# Patient Record
Sex: FEMALE | Race: BLACK OR AFRICAN AMERICAN | Employment: FULL TIME | URBAN - METROPOLITAN AREA
[De-identification: names, ages, dates, MRNs, and addresses within clinical notes are randomized per-mention and may not be internally consistent; named-entity substitution may affect disease eponyms.]

---

## 2017-03-20 LAB
ANTIBODY SCREEN OB: NEGATIVE
HEPATITIS B SURFACE ANTIGEN OB: NEGATIVE
HIV RESULT OB: NEGATIVE
RAPID PLASMA REAGIN OB: NONREACTIVE

## 2017-06-25 ENCOUNTER — HOSPITAL ENCOUNTER (INPATIENT)
Facility: HOSPITAL | Age: 42
LOS: 3 days | Discharge: HOME | End: 2017-06-28
Attending: OBSTETRICS & GYNECOLOGY | Admitting: OBSTETRICS & GYNECOLOGY
Payer: COMMERCIAL

## 2017-06-25 ENCOUNTER — APPOINTMENT (OUTPATIENT)
Dept: ULTRASOUND IMAGING | Facility: HOSPITAL | Age: 42
End: 2017-06-25
Attending: OBSTETRICS & GYNECOLOGY
Payer: COMMERCIAL

## 2017-06-25 PROBLEM — Z34.90 PREGNANCY: Status: ACTIVE | Noted: 2017-06-25

## 2017-06-25 LAB
ALBUMIN SERPL-MCNC: 2.5 G/DL (ref 3.5–4.8)
ALBUMIN SERPL-MCNC: 2.7 G/DL (ref 3.5–4.8)
ALBUMIN SERPL-MCNC: 2.9 G/DL (ref 3.5–4.8)
ALBUMIN SERPL-MCNC: 3 G/DL (ref 3.5–4.8)
ALBUMIN SERPL-MCNC: 3.4 G/DL (ref 3.5–4.8)
ALP LIVER SERPL-CCNC: 117 U/L (ref 37–98)
ALP LIVER SERPL-CCNC: 83 U/L (ref 37–98)
ALP LIVER SERPL-CCNC: 91 U/L (ref 37–98)
ALP LIVER SERPL-CCNC: 92 U/L (ref 37–98)
ALP LIVER SERPL-CCNC: 96 U/L (ref 37–98)
ALT SERPL-CCNC: 62 U/L (ref 14–54)
ALT SERPL-CCNC: 67 U/L (ref 14–54)
ALT SERPL-CCNC: 73 U/L (ref 14–54)
ALT SERPL-CCNC: 75 U/L (ref 14–54)
ALT SERPL-CCNC: 86 U/L (ref 14–54)
AMPHETAMINE URINE: NEGATIVE
ANTIBODY SCREEN: NEGATIVE
APTT PPP: 30 SECONDS (ref 25–34)
APTT PPP: 30.4 SECONDS (ref 25–34)
APTT PPP: 31.8 SECONDS (ref 25–34)
APTT PPP: 34.2 SECONDS (ref 25–34)
APTT PPP: 40.9 SECONDS (ref 25–34)
AST SERPL-CCNC: 45 U/L (ref 15–41)
AST SERPL-CCNC: 45 U/L (ref 15–41)
AST SERPL-CCNC: 49 U/L (ref 15–41)
AST SERPL-CCNC: 51 U/L (ref 15–41)
AST SERPL-CCNC: 59 U/L (ref 15–41)
BARBITURATES URINE: NEGATIVE
BASOPHILS # BLD AUTO: 0.01 X10(3) UL (ref 0–0.1)
BASOPHILS # BLD AUTO: 0.02 X10(3) UL (ref 0–0.1)
BASOPHILS NFR BLD AUTO: 0.1 %
BENZODIAZEPINES URINE: NEGATIVE
BILIRUB SERPL-MCNC: 0.5 MG/DL (ref 0.1–2)
BILIRUB SERPL-MCNC: 0.6 MG/DL (ref 0.1–2)
BILIRUB SERPL-MCNC: 0.6 MG/DL (ref 0.1–2)
BILIRUB SERPL-MCNC: 0.8 MG/DL (ref 0.1–2)
BILIRUB SERPL-MCNC: 1 MG/DL (ref 0.1–2)
BUN BLD-MCNC: 6 MG/DL (ref 8–20)
BUN BLD-MCNC: 7 MG/DL (ref 8–20)
BUN BLD-MCNC: 7 MG/DL (ref 8–20)
CALCIUM BLD-MCNC: 8 MG/DL (ref 8.3–10.3)
CALCIUM BLD-MCNC: 8.1 MG/DL (ref 8.3–10.3)
CALCIUM BLD-MCNC: 8.8 MG/DL (ref 8.3–10.3)
CALCIUM BLD-MCNC: 8.8 MG/DL (ref 8.3–10.3)
CALCIUM BLD-MCNC: 8.9 MG/DL (ref 8.3–10.3)
CANNABINOID URINE: NEGATIVE
CHLORIDE: 100 MMOL/L (ref 101–111)
CHLORIDE: 102 MMOL/L (ref 101–111)
CHLORIDE: 103 MMOL/L (ref 101–111)
CHLORIDE: 105 MMOL/L (ref 101–111)
CHLORIDE: 106 MMOL/L (ref 101–111)
CO2: 21 MMOL/L (ref 22–32)
CO2: 23 MMOL/L (ref 22–32)
CO2: 23 MMOL/L (ref 22–32)
CO2: 24 MMOL/L (ref 22–32)
CO2: 24 MMOL/L (ref 22–32)
COCAINE URINE: NEGATIVE
CONTROL RUN WITHIN 24 HOURS?: YES
CREAT BLD-MCNC: 0.61 MG/DL (ref 0.55–1.02)
CREAT BLD-MCNC: 0.7 MG/DL (ref 0.55–1.02)
CREAT BLD-MCNC: 0.71 MG/DL (ref 0.55–1.02)
CREAT BLD-MCNC: 0.8 MG/DL (ref 0.55–1.02)
CREAT BLD-MCNC: 0.82 MG/DL (ref 0.55–1.02)
CREAT UR-SCNC: 327 MG/DL
EOSINOPHIL # BLD AUTO: 0 X10(3) UL (ref 0–0.3)
EOSINOPHIL # BLD AUTO: 0.01 X10(3) UL (ref 0–0.3)
EOSINOPHIL NFR BLD AUTO: 0 %
EOSINOPHIL NFR BLD AUTO: 0.1 %
ERYTHROCYTE [DISTWIDTH] IN BLOOD BY AUTOMATED COUNT: 12.7 % (ref 11.5–16)
ERYTHROCYTE [DISTWIDTH] IN BLOOD BY AUTOMATED COUNT: 13 % (ref 11.5–16)
ERYTHROCYTE [DISTWIDTH] IN BLOOD BY AUTOMATED COUNT: 13.1 % (ref 11.5–16)
ERYTHROCYTE [DISTWIDTH] IN BLOOD BY AUTOMATED COUNT: 13.2 % (ref 11.5–16)
ERYTHROCYTE [DISTWIDTH] IN BLOOD BY AUTOMATED COUNT: 13.3 % (ref 11.5–16)
EST. AVERAGE GLUCOSE BLD GHB EST-MCNC: 100 MG/DL (ref 68–126)
FIBRINOGEN: 135 MG/DL (ref 200–446)
FIBRINOGEN: 186 MG/DL (ref 200–446)
FIBRINOGEN: 216 MG/DL (ref 200–446)
FIBRINOGEN: 224 MG/DL (ref 200–446)
FIBRINOGEN: 68 MG/DL (ref 200–446)
GLUCOSE BLD-MCNC: 100 MG/DL (ref 70–99)
GLUCOSE BLD-MCNC: 101 MG/DL (ref 70–99)
GLUCOSE BLD-MCNC: 108 MG/DL (ref 70–99)
GLUCOSE BLD-MCNC: 113 MG/DL (ref 70–99)
GLUCOSE BLD-MCNC: 116 MG/DL (ref 70–99)
GLUCOSE URINE: NEGATIVE
HAV IGM SER QL: 4.1 MG/DL (ref 1.7–3)
HBA1C MFR BLD HPLC: 5.1 % (ref ?–5.7)
HCT VFR BLD AUTO: 21.4 % (ref 34–50)
HCT VFR BLD AUTO: 26.2 % (ref 34–50)
HCT VFR BLD AUTO: 27.1 % (ref 34–50)
HCT VFR BLD AUTO: 27.4 % (ref 34–50)
HCT VFR BLD AUTO: 29 % (ref 34–50)
HGB BLD-MCNC: 10.1 G/DL (ref 12–16)
HGB BLD-MCNC: 7.3 G/DL (ref 12–16)
HGB BLD-MCNC: 9 G/DL (ref 12–16)
HGB BLD-MCNC: 9.3 G/DL (ref 12–16)
HGB BLD-MCNC: 9.4 G/DL (ref 12–16)
HOMOCYSTEINE: 5.4 UMOL/L (ref 5–13.9)
IMMATURE GRANULOCYTE COUNT: 0.05 X10(3) UL (ref 0–1)
IMMATURE GRANULOCYTE COUNT: 0.06 X10(3) UL (ref 0–1)
IMMATURE GRANULOCYTE COUNT: 0.09 X10(3) UL (ref 0–1)
IMMATURE GRANULOCYTE COUNT: 0.12 X10(3) UL (ref 0–1)
IMMATURE GRANULOCYTE RATIO %: 0.4 %
IMMATURE GRANULOCYTE RATIO %: 0.5 %
IMMATURE GRANULOCYTE RATIO %: 0.7 %
IMMATURE GRANULOCYTE RATIO %: 0.9 %
INR BLD: 1.16 (ref 0.89–1.11)
INR BLD: 1.19 (ref 0.89–1.11)
INR BLD: 1.2 (ref 0.89–1.11)
INR BLD: 1.23 (ref 0.89–1.11)
INR BLD: 1.45 (ref 0.89–1.11)
KETONE URINE: NEGATIVE
KLEIHAUER BETKE RESULT: NEGATIVE
LEUKOCYTE ESTERASE URINE: NEGATIVE
LYMPHOCYTES # BLD AUTO: 0.51 X10(3) UL (ref 0.9–4)
LYMPHOCYTES # BLD AUTO: 0.54 X10(3) UL (ref 0.9–4)
LYMPHOCYTES # BLD AUTO: 0.72 X10(3) UL (ref 0.9–4)
LYMPHOCYTES # BLD AUTO: 0.9 X10(3) UL (ref 0.9–4)
LYMPHOCYTES NFR BLD AUTO: 4.3 %
LYMPHOCYTES NFR BLD AUTO: 4.6 %
LYMPHOCYTES NFR BLD AUTO: 5.2 %
LYMPHOCYTES NFR BLD AUTO: 6.6 %
M PROTEIN MFR SERPL ELPH: 5.8 G/DL (ref 6.1–8.3)
M PROTEIN MFR SERPL ELPH: 6.2 G/DL (ref 6.1–8.3)
M PROTEIN MFR SERPL ELPH: 6.6 G/DL (ref 6.1–8.3)
M PROTEIN MFR SERPL ELPH: 6.8 G/DL (ref 6.1–8.3)
M PROTEIN MFR SERPL ELPH: 7.9 G/DL (ref 6.1–8.3)
MCH RBC QN AUTO: 30.8 PG (ref 27–33.2)
MCH RBC QN AUTO: 30.8 PG (ref 27–33.2)
MCH RBC QN AUTO: 31.2 PG (ref 27–33.2)
MCH RBC QN AUTO: 31.3 PG (ref 27–33.2)
MCH RBC QN AUTO: 31.6 PG (ref 27–33.2)
MCHC RBC AUTO-ENTMCNC: 34.1 G/DL (ref 31–37)
MCHC RBC AUTO-ENTMCNC: 34.3 G/DL (ref 31–37)
MCHC RBC AUTO-ENTMCNC: 34.3 G/DL (ref 31–37)
MCHC RBC AUTO-ENTMCNC: 34.4 G/DL (ref 31–37)
MCHC RBC AUTO-ENTMCNC: 34.8 G/DL (ref 31–37)
MCV RBC AUTO: 89.5 FL (ref 81–100)
MCV RBC AUTO: 89.7 FL (ref 81–100)
MCV RBC AUTO: 89.8 FL (ref 81–100)
MCV RBC AUTO: 91.2 FL (ref 81–100)
MCV RBC AUTO: 92.6 FL (ref 81–100)
MONOCYTES # BLD AUTO: 0.46 X10(3) UL (ref 0.1–0.6)
MONOCYTES # BLD AUTO: 0.48 X10(3) UL (ref 0.1–0.6)
MONOCYTES # BLD AUTO: 0.84 X10(3) UL (ref 0.1–0.6)
MONOCYTES # BLD AUTO: 0.96 X10(3) UL (ref 0.1–0.6)
MONOCYTES NFR BLD AUTO: 3.9 %
MONOCYTES NFR BLD AUTO: 4.1 %
MONOCYTES NFR BLD AUTO: 6 %
MONOCYTES NFR BLD AUTO: 7.1 %
MORPHOLOGY: NORMAL
NEUTROPHIL ABS PRELIM: 10.61 X10 (3) UL (ref 1.3–6.7)
NEUTROPHIL ABS PRELIM: 10.76 X10 (3) UL (ref 1.3–6.7)
NEUTROPHIL ABS PRELIM: 11.64 X10 (3) UL (ref 1.3–6.7)
NEUTROPHIL ABS PRELIM: 12.19 X10 (3) UL (ref 1.3–6.7)
NEUTROPHILS # BLD AUTO: 10.61 X10(3) UL (ref 1.3–6.7)
NEUTROPHILS # BLD AUTO: 10.76 X10(3) UL (ref 1.3–6.7)
NEUTROPHILS # BLD AUTO: 11.64 X10(3) UL (ref 1.3–6.7)
NEUTROPHILS # BLD AUTO: 12.19 X10(3) UL (ref 1.3–6.7)
NEUTROPHILS NFR BLD AUTO: 85.4 %
NEUTROPHILS NFR BLD AUTO: 87.8 %
NEUTROPHILS NFR BLD AUTO: 91 %
NEUTROPHILS NFR BLD AUTO: 91 %
NITRITE URINE: NEGATIVE
OPIATE URINE: NEGATIVE
PCP URINE: NEGATIVE
PH URINE: 6.5 (ref 5–8)
PLATELET # BLD AUTO: 101 10(3)UL (ref 150–450)
PLATELET # BLD AUTO: 102 10(3)UL (ref 150–450)
PLATELET # BLD AUTO: 113 10(3)UL (ref 150–450)
PLATELET # BLD AUTO: 117 10(3)UL (ref 150–450)
PLATELET # BLD AUTO: 99 10(3)UL (ref 150–450)
PLATELET MORPHOLOGY: NORMAL
POTASSIUM SERPL-SCNC: 3.2 MMOL/L (ref 3.6–5.1)
POTASSIUM SERPL-SCNC: 3.5 MMOL/L (ref 3.6–5.1)
POTASSIUM SERPL-SCNC: 3.7 MMOL/L (ref 3.6–5.1)
POTASSIUM SERPL-SCNC: 3.8 MMOL/L (ref 3.6–5.1)
POTASSIUM SERPL-SCNC: 4 MMOL/L (ref 3.6–5.1)
PROT UR-MCNC: 234.5 MG/DL
PROTEIN URINE: 300
PSA SERPL DL<=0.01 NG/ML-MCNC: 14.9 SECONDS (ref 12–14.3)
PSA SERPL DL<=0.01 NG/ML-MCNC: 15.2 SECONDS (ref 12–14.3)
PSA SERPL DL<=0.01 NG/ML-MCNC: 15.3 SECONDS (ref 12–14.3)
PSA SERPL DL<=0.01 NG/ML-MCNC: 15.6 SECONDS (ref 12–14.3)
PSA SERPL DL<=0.01 NG/ML-MCNC: 17.8 SECONDS (ref 12–14.3)
RAPID HIV: NONREACTIVE
RBC # BLD AUTO: 2.31 X10(6)UL (ref 3.8–5.1)
RBC # BLD AUTO: 2.92 X10(6)UL (ref 3.8–5.1)
RBC # BLD AUTO: 2.97 X10(6)UL (ref 3.8–5.1)
RBC # BLD AUTO: 3.05 X10(6)UL (ref 3.8–5.1)
RBC # BLD AUTO: 3.24 X10(6)UL (ref 3.8–5.1)
RED CELL DISTRIBUTION WIDTH-SD: 41.4 FL (ref 35.1–46.3)
RED CELL DISTRIBUTION WIDTH-SD: 42.3 FL (ref 35.1–46.3)
RED CELL DISTRIBUTION WIDTH-SD: 42.7 FL (ref 35.1–46.3)
RED CELL DISTRIBUTION WIDTH-SD: 43.5 FL (ref 35.1–46.3)
RED CELL DISTRIBUTION WIDTH-SD: 43.6 FL (ref 35.1–46.3)
RH BLOOD TYPE: POSITIVE
SODIUM SERPL-SCNC: 135 MMOL/L (ref 136–144)
SODIUM SERPL-SCNC: 135 MMOL/L (ref 136–144)
SODIUM SERPL-SCNC: 136 MMOL/L (ref 136–144)
SODIUM SERPL-SCNC: 137 MMOL/L (ref 136–144)
SODIUM SERPL-SCNC: 140 MMOL/L (ref 136–144)
SPEC GRAVITY: >=1.03 (ref 1–1.03)
T PALLIDUM AB SER QL IA: NONREACTIVE
TOXIC GRANULATION: PRESENT
TSI SER-ACNC: 1.78 MIU/ML (ref 0.35–5.5)
URIC ACID: 3.5 MG/DL (ref 2.4–8)
URINE PROTEIN/CREATININE RATIO, RANDOM: 0.72
UROBILINOGEN URINE: 1
WBC # BLD AUTO: 11.7 X10(3) UL (ref 4–13)
WBC # BLD AUTO: 11.8 X10(3) UL (ref 4–13)
WBC # BLD AUTO: 13.6 X10(3) UL (ref 4–13)
WBC # BLD AUTO: 13.9 X10(3) UL (ref 4–13)
WBC # BLD AUTO: 9.7 X10(3) UL (ref 4–13)

## 2017-06-25 PROCEDURE — 81291 MTHFR GENE: CPT | Performed by: OBSTETRICS & GYNECOLOGY

## 2017-06-25 PROCEDURE — 85027 COMPLETE CBC AUTOMATED: CPT | Performed by: OBSTETRICS & GYNECOLOGY

## 2017-06-25 PROCEDURE — 85610 PROTHROMBIN TIME: CPT | Performed by: OBSTETRICS & GYNECOLOGY

## 2017-06-25 PROCEDURE — 85732 THROMBOPLASTIN TIME PARTIAL: CPT | Performed by: OBSTETRICS & GYNECOLOGY

## 2017-06-25 PROCEDURE — 86645 CMV ANTIBODY IGM: CPT | Performed by: OBSTETRICS & GYNECOLOGY

## 2017-06-25 PROCEDURE — 86850 RBC ANTIBODY SCREEN: CPT | Performed by: OBSTETRICS & GYNECOLOGY

## 2017-06-25 PROCEDURE — 85613 RUSSELL VIPER VENOM DILUTED: CPT | Performed by: OBSTETRICS & GYNECOLOGY

## 2017-06-25 PROCEDURE — 85306 CLOT INHIBIT PROT S FREE: CPT | Performed by: OBSTETRICS & GYNECOLOGY

## 2017-06-25 PROCEDURE — 84156 ASSAY OF PROTEIN URINE: CPT | Performed by: OBSTETRICS & GYNECOLOGY

## 2017-06-25 PROCEDURE — 76815 OB US LIMITED FETUS(S): CPT | Performed by: OBSTETRICS & GYNECOLOGY

## 2017-06-25 PROCEDURE — 80053 COMPREHEN METABOLIC PANEL: CPT | Performed by: OBSTETRICS & GYNECOLOGY

## 2017-06-25 PROCEDURE — 30233N1 TRANSFUSION OF NONAUTOLOGOUS RED BLOOD CELLS INTO PERIPHERAL VEIN, PERCUTANEOUS APPROACH: ICD-10-PCS | Performed by: OBSTETRICS & GYNECOLOGY

## 2017-06-25 PROCEDURE — 81241 F5 GENE: CPT | Performed by: OBSTETRICS & GYNECOLOGY

## 2017-06-25 PROCEDURE — 85384 FIBRINOGEN ACTIVITY: CPT | Performed by: OBSTETRICS & GYNECOLOGY

## 2017-06-25 PROCEDURE — 86694 HERPES SIMPLEX NES ANTBDY: CPT | Performed by: OBSTETRICS & GYNECOLOGY

## 2017-06-25 PROCEDURE — 86701 HIV-1ANTIBODY: CPT | Performed by: OBSTETRICS & GYNECOLOGY

## 2017-06-25 PROCEDURE — 99255 IP/OBS CONSLTJ NEW/EST HI 80: CPT | Performed by: INTERNAL MEDICINE

## 2017-06-25 PROCEDURE — 86778 TOXOPLASMA ANTIBODY IGM: CPT | Performed by: OBSTETRICS & GYNECOLOGY

## 2017-06-25 PROCEDURE — 59409 OBSTETRICAL CARE: CPT | Performed by: OBSTETRICS & GYNECOLOGY

## 2017-06-25 PROCEDURE — 81240 F2 GENE: CPT | Performed by: OBSTETRICS & GYNECOLOGY

## 2017-06-25 PROCEDURE — 3E0P7GC INTRODUCTION OF OTHER THERAPEUTIC SUBSTANCE INTO FEMALE REPRODUCTIVE, VIA NATURAL OR ARTIFICIAL OPENING: ICD-10-PCS | Performed by: OBSTETRICS & GYNECOLOGY

## 2017-06-25 PROCEDURE — 85730 THROMBOPLASTIN TIME PARTIAL: CPT | Performed by: OBSTETRICS & GYNECOLOGY

## 2017-06-25 PROCEDURE — 86147 CARDIOLIPIN ANTIBODY EA IG: CPT | Performed by: OBSTETRICS & GYNECOLOGY

## 2017-06-25 PROCEDURE — 82570 ASSAY OF URINE CREATININE: CPT | Performed by: OBSTETRICS & GYNECOLOGY

## 2017-06-25 PROCEDURE — 30233K1 TRANSFUSION OF NONAUTOLOGOUS FROZEN PLASMA INTO PERIPHERAL VEIN, PERCUTANEOUS APPROACH: ICD-10-PCS | Performed by: OBSTETRICS & GYNECOLOGY

## 2017-06-25 PROCEDURE — 86762 RUBELLA ANTIBODY: CPT | Performed by: OBSTETRICS & GYNECOLOGY

## 2017-06-25 PROCEDURE — 3E033VJ INTRODUCTION OF OTHER HORMONE INTO PERIPHERAL VEIN, PERCUTANEOUS APPROACH: ICD-10-PCS | Performed by: OBSTETRICS & GYNECOLOGY

## 2017-06-25 PROCEDURE — 80307 DRUG TEST PRSMV CHEM ANLYZR: CPT | Performed by: OBSTETRICS & GYNECOLOGY

## 2017-06-25 PROCEDURE — 85303 CLOT INHIBIT PROT C ACTIVITY: CPT | Performed by: OBSTETRICS & GYNECOLOGY

## 2017-06-25 PROCEDURE — 30233M1 TRANSFUSION OF NONAUTOLOGOUS PLASMA CRYOPRECIPITATE INTO PERIPHERAL VEIN, PERCUTANEOUS APPROACH: ICD-10-PCS | Performed by: OBSTETRICS & GYNECOLOGY

## 2017-06-25 PROCEDURE — 86146 BETA-2 GLYCOPROTEIN ANTIBODY: CPT | Performed by: OBSTETRICS & GYNECOLOGY

## 2017-06-25 PROCEDURE — 85301 ANTITHROMBIN III ANTIGEN: CPT | Performed by: OBSTETRICS & GYNECOLOGY

## 2017-06-25 PROCEDURE — 86901 BLOOD TYPING SEROLOGIC RH(D): CPT | Performed by: OBSTETRICS & GYNECOLOGY

## 2017-06-25 PROCEDURE — 85705 THROMBOPLASTIN INHIBITION: CPT | Performed by: OBSTETRICS & GYNECOLOGY

## 2017-06-25 PROCEDURE — 85300 ANTITHROMBIN III ACTIVITY: CPT | Performed by: OBSTETRICS & GYNECOLOGY

## 2017-06-25 PROCEDURE — 85670 THROMBIN TIME PLASMA: CPT | Performed by: OBSTETRICS & GYNECOLOGY

## 2017-06-25 PROCEDURE — 86747 PARVOVIRUS ANTIBODY: CPT | Performed by: OBSTETRICS & GYNECOLOGY

## 2017-06-25 PROCEDURE — 86900 BLOOD TYPING SEROLOGIC ABO: CPT | Performed by: OBSTETRICS & GYNECOLOGY

## 2017-06-25 PROCEDURE — 83090 ASSAY OF HOMOCYSTEINE: CPT | Performed by: OBSTETRICS & GYNECOLOGY

## 2017-06-25 PROCEDURE — 86780 TREPONEMA PALLIDUM: CPT | Performed by: OBSTETRICS & GYNECOLOGY

## 2017-06-25 PROCEDURE — 85025 COMPLETE CBC W/AUTO DIFF WBC: CPT | Performed by: OBSTETRICS & GYNECOLOGY

## 2017-06-25 PROCEDURE — 84550 ASSAY OF BLOOD/URIC ACID: CPT | Performed by: OBSTETRICS & GYNECOLOGY

## 2017-06-25 PROCEDURE — 84443 ASSAY THYROID STIM HORMONE: CPT | Performed by: OBSTETRICS & GYNECOLOGY

## 2017-06-25 PROCEDURE — 83036 HEMOGLOBIN GLYCOSYLATED A1C: CPT | Performed by: OBSTETRICS & GYNECOLOGY

## 2017-06-25 RX ORDER — HYDROMORPHONE HYDROCHLORIDE 1 MG/ML
0.4 INJECTION, SOLUTION INTRAMUSCULAR; INTRAVENOUS; SUBCUTANEOUS EVERY 30 MIN PRN
Status: DISCONTINUED | OUTPATIENT
Start: 2017-06-25 | End: 2017-06-28

## 2017-06-25 RX ORDER — SODIUM CHLORIDE 9 MG/ML
INJECTION, SOLUTION INTRAVENOUS ONCE
Status: DISCONTINUED | OUTPATIENT
Start: 2017-06-25 | End: 2017-06-28

## 2017-06-25 RX ORDER — HYDRALAZINE HYDROCHLORIDE 20 MG/ML
10 INJECTION INTRAMUSCULAR; INTRAVENOUS ONCE AS NEEDED
Status: DISCONTINUED | OUTPATIENT
Start: 2017-06-28 | End: 2017-06-28

## 2017-06-25 RX ORDER — MISOPROSTOL 200 UG/1
200 TABLET ORAL EVERY 4 HOURS
Status: DISCONTINUED | OUTPATIENT
Start: 2017-06-25 | End: 2017-06-25

## 2017-06-25 RX ORDER — HYDROMORPHONE HYDROCHLORIDE 1 MG/ML
1 INJECTION, SOLUTION INTRAMUSCULAR; INTRAVENOUS; SUBCUTANEOUS
Status: DISCONTINUED | OUTPATIENT
Start: 2017-06-25 | End: 2017-06-25

## 2017-06-25 RX ORDER — LABETALOL HYDROCHLORIDE 5 MG/ML
40 INJECTION, SOLUTION INTRAVENOUS ONCE AS NEEDED
Status: ACTIVE | OUTPATIENT
Start: 2017-06-26 | End: 2017-06-26

## 2017-06-25 RX ORDER — HYDROMORPHONE HYDROCHLORIDE 1 MG/ML
INJECTION, SOLUTION INTRAMUSCULAR; INTRAVENOUS; SUBCUTANEOUS
Status: COMPLETED
Start: 2017-06-25 | End: 2017-06-26

## 2017-06-25 RX ORDER — PYRIDOXINE HCL (VITAMIN B6) 50 MG
TABLET ORAL
COMMUNITY

## 2017-06-25 RX ORDER — LABETALOL HYDROCHLORIDE 5 MG/ML
20 INJECTION, SOLUTION INTRAVENOUS ONCE
Status: DISCONTINUED | OUTPATIENT
Start: 2017-06-25 | End: 2017-06-28

## 2017-06-25 RX ORDER — LABETALOL HYDROCHLORIDE 5 MG/ML
80 INJECTION, SOLUTION INTRAVENOUS ONCE AS NEEDED
Status: ACTIVE | OUTPATIENT
Start: 2017-06-27 | End: 2017-06-27

## 2017-06-25 RX ORDER — EPHEDRINE SULFATE 50 MG/ML
5 INJECTION, SOLUTION INTRAVENOUS AS NEEDED
Status: DISCONTINUED | OUTPATIENT
Start: 2017-06-25 | End: 2017-06-26

## 2017-06-25 RX ORDER — NALOXONE HYDROCHLORIDE 0.4 MG/ML
0.08 INJECTION, SOLUTION INTRAMUSCULAR; INTRAVENOUS; SUBCUTANEOUS
Status: DISCONTINUED | OUTPATIENT
Start: 2017-06-25 | End: 2017-06-28

## 2017-06-25 RX ORDER — PRENATAL VIT/IRON FUM/FOLIC AC 27MG-0.8MG
1 TABLET ORAL DAILY
COMMUNITY
End: 2020-05-26

## 2017-06-25 RX ORDER — ACETAMINOPHEN 500 MG
TABLET ORAL
Status: COMPLETED
Start: 2017-06-25 | End: 2017-06-27

## 2017-06-25 RX ORDER — GLUCOSAMINE HCL 500 MG
4000 TABLET ORAL
COMMUNITY
End: 2018-12-20

## 2017-06-25 RX ORDER — ONDANSETRON 2 MG/ML
4 INJECTION INTRAMUSCULAR; INTRAVENOUS EVERY 6 HOURS PRN
Status: DISCONTINUED | OUTPATIENT
Start: 2017-06-25 | End: 2017-06-28

## 2017-06-25 RX ORDER — MISOPROSTOL 200 UG/1
400 TABLET ORAL
Status: DISCONTINUED | OUTPATIENT
Start: 2017-06-25 | End: 2017-06-27

## 2017-06-25 RX ORDER — LABETALOL HYDROCHLORIDE 5 MG/ML
20 INJECTION, SOLUTION INTRAVENOUS ONCE AS NEEDED
Status: COMPLETED | OUTPATIENT
Start: 2017-06-25 | End: 2017-06-25

## 2017-06-25 RX ORDER — LABETALOL HYDROCHLORIDE 5 MG/ML
INJECTION, SOLUTION INTRAVENOUS
Status: COMPLETED
Start: 2017-06-25 | End: 2017-06-25

## 2017-06-25 RX ORDER — CALCIUM GLUCONATE 94 MG/ML
1 INJECTION, SOLUTION INTRAVENOUS ONCE AS NEEDED
Status: ACTIVE | OUTPATIENT
Start: 2017-06-25 | End: 2017-06-25

## 2017-06-25 RX ORDER — SODIUM CHLORIDE, SODIUM LACTATE, POTASSIUM CHLORIDE, CALCIUM CHLORIDE 600; 310; 30; 20 MG/100ML; MG/100ML; MG/100ML; MG/100ML
INJECTION, SOLUTION INTRAVENOUS CONTINUOUS
Status: DISCONTINUED | OUTPATIENT
Start: 2017-06-25 | End: 2017-06-27

## 2017-06-25 RX ORDER — SODIUM CHLORIDE 9 MG/ML
INJECTION, SOLUTION INTRAVENOUS ONCE
Status: COMPLETED | OUTPATIENT
Start: 2017-06-25 | End: 2017-06-25

## 2017-06-25 RX ORDER — MISOPROSTOL 200 UG/1
200 TABLET ORAL
Status: DISCONTINUED | OUTPATIENT
Start: 2017-06-25 | End: 2017-06-25

## 2017-06-25 RX ORDER — ACETAMINOPHEN 500 MG
1000 TABLET ORAL EVERY 6 HOURS PRN
Status: DISCONTINUED | OUTPATIENT
Start: 2017-06-25 | End: 2017-06-28

## 2017-06-25 RX ORDER — NALBUPHINE HCL 10 MG/ML
2.5 AMPUL (ML) INJECTION EVERY 4 HOURS PRN
Status: DISCONTINUED | OUTPATIENT
Start: 2017-06-25 | End: 2017-06-28

## 2017-06-25 RX ORDER — EPHEDRINE SULFATE 50 MG/ML
5 INJECTION, SOLUTION INTRAVENOUS AS NEEDED
Status: DISCONTINUED | OUTPATIENT
Start: 2017-06-25 | End: 2017-06-27

## 2017-06-25 RX ORDER — NALBUPHINE HCL 10 MG/ML
2.5 AMPUL (ML) INJECTION
Status: DISCONTINUED | OUTPATIENT
Start: 2017-06-25 | End: 2017-06-27

## 2017-06-25 RX ORDER — DIPHENHYDRAMINE HYDROCHLORIDE 50 MG/ML
12.5 INJECTION INTRAMUSCULAR; INTRAVENOUS EVERY 4 HOURS PRN
Status: DISCONTINUED | OUTPATIENT
Start: 2017-06-25 | End: 2017-06-28

## 2017-06-25 RX ORDER — MELATONIN
325
COMMUNITY
End: 2017-09-15

## 2017-06-25 RX ORDER — ONDANSETRON 2 MG/ML
4 INJECTION INTRAMUSCULAR; INTRAVENOUS EVERY 6 HOURS PRN
Status: DISCONTINUED | OUTPATIENT
Start: 2017-06-25 | End: 2017-06-25

## 2017-06-25 NOTE — H&P
[facility]  History & Physical    Kingston Hash Patient Status:  Inpatient    1975 MRN [id number]   Location [address] [facility] Attending Eric Torres MD    0 PCP Felicia Guzman MD     CC: patient is here for cramping/ f US w/ GA 22w3d, limited heart views, verbally discussed w/ Dr Yancy Olivares, record not here    Obstetric History:   OB History    Para Term  AB Living   2 0   1    SAB TAB Ectopic Multiple Live Births             # Outcome Date GA Lbr Sg/2nd Weigh °C)  Pulse:  [86] 86  BP: (163)/(101) 163/101    Lungs:   clear to auscultation bilaterally   Heart:   S1, S2 normal, no murmur, click, rub or gallop, regular rate and rhythm, regular rate and rhythm, S1, S2 normal, no murmur, click, rub or gallop   Abdome Will repeat labs 8am after transfusion placed, pt consent for blood products obtained and signed.      Kp Ozuna MD

## 2017-06-25 NOTE — PROGRESS NOTES
OB PN  S: pt with epidural in place. Magnesium infusing.    O:    06/25/17  1000 06/25/17  1015 06/25/17  1018 06/25/17  1021   BP:  156/94 146/89 146/88   BP Location:       Pulse:  70 71 65   Resp: 20      Temp: 98.1 °F (36.7 °C)      TempSrc: Oral      S

## 2017-06-25 NOTE — PLAN OF CARE
Dr maciel notified of patient wanting epidural. MD notified of lab values and patient status. Dr maciel to consult with dr Elaine Garland.

## 2017-06-25 NOTE — CONSULTS
[facility]    Report of Consultation    Lala Alejandro Patient Status:  Inpatient    1975 MRN [id number]   Location 1818 [facility] Attending Marline Altman MD   Hosp Day # 0 PCP Felicia Holliday MD     Date of Admission:   infusion, , Intravenous, Once  •  EPHEDrine Sulfate injection 5 mg, 5 mg, Intravenous, PRN  •  Nalbuphine HCl (NUBAIN) injection 2.5 mg, 2.5 mg, Intravenous, Q15 Min PRN  •  0.9%  NaCl infusion, , Intravenous, Once  •  Labetalol HCl (TRANDATE) injection 20 98.1 °F (36.7 °C) (Oral)   Resp 20   Ht 1.676 m (5' 6\")   Wt 87.5 kg (193 lb)   SpO2 99%   BMI 31.15 kg/m²    HEENT: EOMs intact. PERRL. Oropharynx is clear. Neck: No JVD. No palpable lymphadenopathy. Neck is supple. Chest: Clear to auscultation.    H Will follow with coags, CBC, and comp q 4-6 hours and support with transfusions as needed. Will use Cryo to maintain a fibrinogen >100, FFP to maintain an INR <1.5, and platelet transfusion to maintain plt ct >50k. Monitor BP and treat as needed.   Monit

## 2017-06-25 NOTE — CM/SW NOTE
MSW reviewed order, spoke to [facility]. She states patient does not need to be seen today. Patient has many medical needs at this time. MSW to f/u Monday. Rn asked to call this writer back should patient need/want to be seen today.

## 2017-06-25 NOTE — PROGRESS NOTES
[facility] Pharmacy Note:  Pain Consult    Winston Parson is a 40year old female started on Dilaudid PCA by Dr. Papi Dunlap. Pharmacy was consulted to review medication profile and to discontinue previously ordered narcotics and sedatives.     Medication profile was re

## 2017-06-25 NOTE — IMAGING NOTE
History: : 2 Para: 0. Previous pregnancies: Abortions: 1.    Obstetric History:   Details on previous pregnancies: Intrauterine deaths < 14W: 1.  ____________________________________________________________________________  Dating:  LMP: 2016 E

## 2017-06-25 NOTE — PROGRESS NOTES
OB PN  Called to room for blood noted on nuha by RN. Weighed and 1235g.    Repeat CBC/ fibrinogen drawn  Will increase dose to 400mcg cytotec x 2 doses  Urine ouput low 30cc/ hr will stop magnesium sulfate for now  D/W pt and family plan, agree w/ presen

## 2017-06-25 NOTE — PROGRESS NOTES
Pt is   26 weeks GA  Pt here with C/o cramping and back pain since , pt denies leaking and bleeding PV  Fhts difficult on EFM,  notified and US Bedside done, NO fhts in [address] [facility] .   Report given to [facility], Pt transferred to

## 2017-06-25 NOTE — PLAN OF CARE
ANXIETY    • Will report anxiety at manageable levels Progressing        Anxiety    • Anxiety is at manageable level Progressing        BIRTH - VAGINAL/ SECTION    • Fetal and maternal status remain reassuring during the birth process Progressing

## 2017-06-25 NOTE — PROGRESS NOTES
Pt is 43-year old 26 week gestation in labor  On admission, found to have IUFD  HTN,SBP 160s being treated with IV labetalol  INR=1.45, fibrinogen 168,   Pt c/o discomfort with contractions    Discussed plan with patient and entire care team  Agree

## 2017-06-25 NOTE — CONSULTS
[address] Patient Status:  Inpatient    1975 MRN [id number]   Location 1818 [facility] Attending Eric Torres MD   Hosp Day # 0 PCP Felicia Guzman MD     SUBJECTIVE:  Reason for Admi History:  5/7/2014: KNEE SCOPE,MED/LAT MENISECTOMY      Comment: Procedure: ARTHROSCOPY LEFT KNEE W/ MEDIAL                MENISCECTOMY;  Surgeon: Becky Maravilla MD;                 Location: [address] [place] [address]    Past OB History:  OB History   Gr cephalic. Fetal movement: not present. Amniotic Fluid: normal.   Placenta: anterior. Complete abruption. ____________________________________________________________________________  Anatomy Scan:  [place] gestation.   Biometry:  BPD 67.2 mm 76th%

## 2017-06-26 ENCOUNTER — ANESTHESIA (OUTPATIENT)
Dept: OBGYN UNIT | Facility: HOSPITAL | Age: 42
End: 2017-06-26
Payer: COMMERCIAL

## 2017-06-26 ENCOUNTER — ANESTHESIA EVENT (OUTPATIENT)
Dept: OBGYN UNIT | Facility: HOSPITAL | Age: 42
End: 2017-06-26
Payer: COMMERCIAL

## 2017-06-26 ENCOUNTER — SURGERY (OUTPATIENT)
Age: 42
End: 2017-06-26

## 2017-06-26 LAB
ALBUMIN SERPL-MCNC: 2.1 G/DL (ref 3.5–4.8)
ALBUMIN SERPL-MCNC: 2.3 G/DL (ref 3.5–4.8)
ALP LIVER SERPL-CCNC: 58 U/L (ref 37–98)
ALP LIVER SERPL-CCNC: 60 U/L (ref 37–98)
ALP LIVER SERPL-CCNC: 66 U/L (ref 37–98)
ALP LIVER SERPL-CCNC: 81 U/L (ref 37–98)
ALT SERPL-CCNC: 47 U/L (ref 14–54)
ALT SERPL-CCNC: 47 U/L (ref 14–54)
ALT SERPL-CCNC: 51 U/L (ref 14–54)
ALT SERPL-CCNC: 59 U/L (ref 14–54)
APTT PPP: 32 SECONDS (ref 25–34)
APTT PPP: 36.3 SECONDS (ref 25–34)
APTT PPP: 37.1 SECONDS (ref 25–34)
APTT PPP: 37.9 SECONDS (ref 25–34)
AST SERPL-CCNC: 38 U/L (ref 15–41)
AST SERPL-CCNC: 39 U/L (ref 15–41)
AST SERPL-CCNC: 42 U/L (ref 15–41)
AST SERPL-CCNC: 46 U/L (ref 15–41)
BASOPHILS # BLD AUTO: 0 X10(3) UL (ref 0–0.1)
BASOPHILS # BLD AUTO: 0.01 X10(3) UL (ref 0–0.1)
BASOPHILS # BLD AUTO: 0.02 X10(3) UL (ref 0–0.1)
BASOPHILS NFR BLD AUTO: 0 %
BASOPHILS NFR BLD AUTO: 0.1 %
BASOPHILS NFR BLD AUTO: 0.2 %
BILIRUB SERPL-MCNC: 0.5 MG/DL (ref 0.1–2)
BILIRUB SERPL-MCNC: 0.6 MG/DL (ref 0.1–2)
BILIRUB SERPL-MCNC: 0.7 MG/DL (ref 0.1–2)
BILIRUB SERPL-MCNC: 1 MG/DL (ref 0.1–2)
BLOOD TYPE BARCODE: 6200
BLOOD TYPE BARCODE: 6200
BUN BLD-MCNC: 3 MG/DL (ref 8–20)
BUN BLD-MCNC: 4 MG/DL (ref 8–20)
BUN BLD-MCNC: 5 MG/DL (ref 8–20)
BUN BLD-MCNC: 7 MG/DL (ref 8–20)
CALCIUM BLD-MCNC: 7 MG/DL (ref 8.3–10.3)
CALCIUM BLD-MCNC: 7.1 MG/DL (ref 8.3–10.3)
CALCIUM BLD-MCNC: 7.3 MG/DL (ref 8.3–10.3)
CALCIUM BLD-MCNC: 7.7 MG/DL (ref 8.3–10.3)
CHLORIDE: 106 MMOL/L (ref 101–111)
CHLORIDE: 107 MMOL/L (ref 101–111)
CHLORIDE: 107 MMOL/L (ref 101–111)
CHLORIDE: 108 MMOL/L (ref 101–111)
CMV AB, IGM: NEGATIVE
CO2: 25 MMOL/L (ref 22–32)
CO2: 26 MMOL/L (ref 22–32)
CO2: 26 MMOL/L (ref 22–32)
CO2: 28 MMOL/L (ref 22–32)
CREAT BLD-MCNC: 0.56 MG/DL (ref 0.55–1.02)
CREAT BLD-MCNC: 0.64 MG/DL (ref 0.55–1.02)
CREAT BLD-MCNC: 0.65 MG/DL (ref 0.55–1.02)
CREAT BLD-MCNC: 0.67 MG/DL (ref 0.55–1.02)
DEPRECATED HBV CORE AB SER IA-ACNC: 69.8 NG/ML (ref 10–291)
EOSINOPHIL # BLD AUTO: 0.03 X10(3) UL (ref 0–0.3)
EOSINOPHIL # BLD AUTO: 0.03 X10(3) UL (ref 0–0.3)
EOSINOPHIL # BLD AUTO: 0.04 X10(3) UL (ref 0–0.3)
EOSINOPHIL # BLD AUTO: 0.06 X10(3) UL (ref 0–0.3)
EOSINOPHIL # BLD AUTO: 0.08 X10(3) UL (ref 0–0.3)
EOSINOPHIL NFR BLD AUTO: 0.2 %
EOSINOPHIL NFR BLD AUTO: 0.3 %
EOSINOPHIL NFR BLD AUTO: 0.3 %
EOSINOPHIL NFR BLD AUTO: 0.5 %
EOSINOPHIL NFR BLD AUTO: 0.7 %
ERYTHROCYTE [DISTWIDTH] IN BLOOD BY AUTOMATED COUNT: 13.5 % (ref 11.5–16)
ERYTHROCYTE [DISTWIDTH] IN BLOOD BY AUTOMATED COUNT: 13.7 % (ref 11.5–16)
ERYTHROCYTE [DISTWIDTH] IN BLOOD BY AUTOMATED COUNT: 13.9 % (ref 11.5–16)
ERYTHROCYTE [DISTWIDTH] IN BLOOD BY AUTOMATED COUNT: 14.1 % (ref 11.5–16)
ERYTHROCYTE [DISTWIDTH] IN BLOOD BY AUTOMATED COUNT: 14.6 % (ref 11.5–16)
FIBRINOGEN: 213 MG/DL (ref 200–446)
FIBRINOGEN: 227 MG/DL (ref 200–446)
FIBRINOGEN: 275 MG/DL (ref 200–446)
FIBRINOGEN: 337 MG/DL (ref 200–446)
FOLATE (FOLIC ACID), SERUM: 23.7 NG/ML (ref 8.7–24)
GLUCOSE BLD-MCNC: 100 MG/DL (ref 70–99)
GLUCOSE BLD-MCNC: 117 MG/DL (ref 70–99)
GLUCOSE BLD-MCNC: 94 MG/DL (ref 70–99)
GLUCOSE BLD-MCNC: 99 MG/DL (ref 70–99)
HAPTOGLOBIN: <7.8 MG/DL (ref 30–200)
HAV AB SERPL IA-ACNC: 942 PG/ML (ref 193–986)
HCT VFR BLD AUTO: 18.3 % (ref 34–50)
HCT VFR BLD AUTO: 20.4 % (ref 34–50)
HCT VFR BLD AUTO: 22.1 % (ref 34–50)
HCT VFR BLD AUTO: 23.1 % (ref 34–50)
HCT VFR BLD AUTO: 23.7 % (ref 34–50)
HGB BLD-MCNC: 6.5 G/DL (ref 12–16)
HGB BLD-MCNC: 7.1 G/DL (ref 12–16)
HGB BLD-MCNC: 7.7 G/DL (ref 12–16)
HGB BLD-MCNC: 8.1 G/DL (ref 12–16)
HGB BLD-MCNC: 8.5 G/DL (ref 12–16)
HSV TYPE 1/2 COMBINED ABS, IGM: 0.52 IV
IMMATURE GRANULOCYTE COUNT: 0.04 X10(3) UL (ref 0–1)
IMMATURE GRANULOCYTE COUNT: 0.04 X10(3) UL (ref 0–1)
IMMATURE GRANULOCYTE COUNT: 0.05 X10(3) UL (ref 0–1)
IMMATURE GRANULOCYTE COUNT: 0.05 X10(3) UL (ref 0–1)
IMMATURE GRANULOCYTE COUNT: 0.06 X10(3) UL (ref 0–1)
IMMATURE GRANULOCYTE RATIO %: 0.3 %
IMMATURE GRANULOCYTE RATIO %: 0.3 %
IMMATURE GRANULOCYTE RATIO %: 0.4 %
IMMATURE GRANULOCYTE RATIO %: 0.4 %
IMMATURE GRANULOCYTE RATIO %: 0.5 %
INR BLD: 1.21 (ref 0.89–1.11)
INR BLD: 1.21 (ref 0.89–1.11)
INR BLD: 1.27 (ref 0.89–1.11)
INR BLD: 1.29 (ref 0.89–1.11)
IRON SATURATION: 16 % (ref 13–45)
IRON: 41 UG/DL (ref 28–170)
LDH: 312 U/L (ref 84–246)
LYMPHOCYTES # BLD AUTO: 0.93 X10(3) UL (ref 0.9–4)
LYMPHOCYTES # BLD AUTO: 1.1 X10(3) UL (ref 0.9–4)
LYMPHOCYTES # BLD AUTO: 1.17 X10(3) UL (ref 0.9–4)
LYMPHOCYTES # BLD AUTO: 1.26 X10(3) UL (ref 0.9–4)
LYMPHOCYTES # BLD AUTO: 1.42 X10(3) UL (ref 0.9–4)
LYMPHOCYTES NFR BLD AUTO: 10.2 %
LYMPHOCYTES NFR BLD AUTO: 11.7 %
LYMPHOCYTES NFR BLD AUTO: 7.9 %
LYMPHOCYTES NFR BLD AUTO: 9.3 %
LYMPHOCYTES NFR BLD AUTO: 9.9 %
M PROTEIN MFR SERPL ELPH: 4.9 G/DL (ref 6.1–8.3)
M PROTEIN MFR SERPL ELPH: 4.9 G/DL (ref 6.1–8.3)
M PROTEIN MFR SERPL ELPH: 5 G/DL (ref 6.1–8.3)
M PROTEIN MFR SERPL ELPH: 5.2 G/DL (ref 6.1–8.3)
MCH RBC QN AUTO: 30 PG (ref 27–33.2)
MCH RBC QN AUTO: 30.7 PG (ref 27–33.2)
MCH RBC QN AUTO: 30.8 PG (ref 27–33.2)
MCH RBC QN AUTO: 30.8 PG (ref 27–33.2)
MCH RBC QN AUTO: 30.9 PG (ref 27–33.2)
MCHC RBC AUTO-ENTMCNC: 34.8 G/DL (ref 31–37)
MCHC RBC AUTO-ENTMCNC: 34.8 G/DL (ref 31–37)
MCHC RBC AUTO-ENTMCNC: 35.1 G/DL (ref 31–37)
MCHC RBC AUTO-ENTMCNC: 35.5 G/DL (ref 31–37)
MCHC RBC AUTO-ENTMCNC: 35.9 G/DL (ref 31–37)
MCV RBC AUTO: 85.6 FL (ref 81–100)
MCV RBC AUTO: 86 FL (ref 81–100)
MCV RBC AUTO: 86.7 FL (ref 81–100)
MCV RBC AUTO: 87.8 FL (ref 81–100)
MCV RBC AUTO: 88.7 FL (ref 81–100)
MONOCYTES # BLD AUTO: 0.93 X10(3) UL (ref 0.1–0.6)
MONOCYTES # BLD AUTO: 0.99 X10(3) UL (ref 0.1–0.6)
MONOCYTES # BLD AUTO: 1 X10(3) UL (ref 0.1–0.6)
MONOCYTES # BLD AUTO: 1.06 X10(3) UL (ref 0.1–0.6)
MONOCYTES # BLD AUTO: 1.09 X10(3) UL (ref 0.1–0.6)
MONOCYTES NFR BLD AUTO: 7.8 %
MONOCYTES NFR BLD AUTO: 8.1 %
MONOCYTES NFR BLD AUTO: 8.3 %
MONOCYTES NFR BLD AUTO: 8.7 %
MONOCYTES NFR BLD AUTO: 9.3 %
NEUTROPHIL ABS PRELIM: 9.56 X10 (3) UL (ref 1.3–6.7)
NEUTROPHIL ABS PRELIM: 9.63 X10 (3) UL (ref 1.3–6.7)
NEUTROPHIL ABS PRELIM: 9.65 X10 (3) UL (ref 1.3–6.7)
NEUTROPHIL ABS PRELIM: 9.68 X10 (3) UL (ref 1.3–6.7)
NEUTROPHIL ABS PRELIM: 9.92 X10 (3) UL (ref 1.3–6.7)
NEUTROPHILS # BLD AUTO: 9.56 X10(3) UL (ref 1.3–6.7)
NEUTROPHILS # BLD AUTO: 9.63 X10(3) UL (ref 1.3–6.7)
NEUTROPHILS # BLD AUTO: 9.65 X10(3) UL (ref 1.3–6.7)
NEUTROPHILS # BLD AUTO: 9.68 X10(3) UL (ref 1.3–6.7)
NEUTROPHILS # BLD AUTO: 9.92 X10(3) UL (ref 1.3–6.7)
NEUTROPHILS NFR BLD AUTO: 78.9 %
NEUTROPHILS NFR BLD AUTO: 80.6 %
NEUTROPHILS NFR BLD AUTO: 81.2 %
NEUTROPHILS NFR BLD AUTO: 81.6 %
NEUTROPHILS NFR BLD AUTO: 82.1 %
PARVOVIRUS B19 ANTIBODY IGG: 0.21 IV
PARVOVIRUS B19 ANTIBODY IGM: 0.11 IV
PLATELET # BLD AUTO: 80 10(3)UL (ref 150–450)
PLATELET # BLD AUTO: 81 10(3)UL (ref 150–450)
PLATELET # BLD AUTO: 83 10(3)UL (ref 150–450)
PLATELET # BLD AUTO: 90 10(3)UL (ref 150–450)
PLATELET # BLD AUTO: 99 10(3)UL (ref 150–450)
PLATELET MORPHOLOGY: NORMAL
POTASSIUM SERPL-SCNC: 3.4 MMOL/L (ref 3.6–5.1)
POTASSIUM SERPL-SCNC: 3.4 MMOL/L (ref 3.6–5.1)
POTASSIUM SERPL-SCNC: 3.7 MMOL/L (ref 3.6–5.1)
POTASSIUM SERPL-SCNC: 3.7 MMOL/L (ref 3.6–5.1)
PSA SERPL DL<=0.01 NG/ML-MCNC: 15.4 SECONDS (ref 12–14.3)
PSA SERPL DL<=0.01 NG/ML-MCNC: 15.4 SECONDS (ref 12–14.3)
PSA SERPL DL<=0.01 NG/ML-MCNC: 16 SECONDS (ref 12–14.3)
PSA SERPL DL<=0.01 NG/ML-MCNC: 16.2 SECONDS (ref 12–14.3)
RBC # BLD AUTO: 2.11 X10(6)UL (ref 3.8–5.1)
RBC # BLD AUTO: 2.3 X10(6)UL (ref 3.8–5.1)
RBC # BLD AUTO: 2.57 X10(6)UL (ref 3.8–5.1)
RBC # BLD AUTO: 2.63 X10(6)UL (ref 3.8–5.1)
RBC # BLD AUTO: 2.77 X10(6)UL (ref 3.8–5.1)
RED CELL DISTRIBUTION WIDTH-SD: 42.5 FL (ref 35.1–46.3)
RED CELL DISTRIBUTION WIDTH-SD: 42.8 FL (ref 35.1–46.3)
RED CELL DISTRIBUTION WIDTH-SD: 43.7 FL (ref 35.1–46.3)
RED CELL DISTRIBUTION WIDTH-SD: 44.3 FL (ref 35.1–46.3)
RED CELL DISTRIBUTION WIDTH-SD: 45.1 FL (ref 35.1–46.3)
RETIC ABS CALC AUTO: 60.7 X10(3) UL (ref 22.5–147.5)
RETIC IRF CALC: 0.35 RATIO (ref 0.09–0.3)
RETIC%: 2.9 % (ref 0.5–2.5)
RETICULOCYTE HEMOGLOBIN EQUIVALENT: 38 PG (ref 28.2–36.3)
RUBELLA ANTIBODY, IGM: <10 AU/ML
SODIUM SERPL-SCNC: 139 MMOL/L (ref 136–144)
SODIUM SERPL-SCNC: 139 MMOL/L (ref 136–144)
SODIUM SERPL-SCNC: 141 MMOL/L (ref 136–144)
SODIUM SERPL-SCNC: 142 MMOL/L (ref 136–144)
TOTAL IRON BINDING CAPACITY: 252 UG/DL (ref 298–536)
TOXOPLASMA GONDII AB, IGM: <3 AU/ML
TRANSFERRIN: 169 MG/DL (ref 200–360)
WBC # BLD AUTO: 11.8 X10(3) UL (ref 4–13)
WBC # BLD AUTO: 11.9 X10(3) UL (ref 4–13)
WBC # BLD AUTO: 11.9 X10(3) UL (ref 4–13)
WBC # BLD AUTO: 12.1 X10(3) UL (ref 4–13)
WBC # BLD AUTO: 12.3 X10(3) UL (ref 4–13)

## 2017-06-26 PROCEDURE — 59160 D & C AFTER DELIVERY: CPT | Performed by: OBSTETRICS & GYNECOLOGY

## 2017-06-26 PROCEDURE — 99232 SBSQ HOSP IP/OBS MODERATE 35: CPT | Performed by: INTERNAL MEDICINE

## 2017-06-26 PROCEDURE — 0W3R7ZZ CONTROL BLEEDING IN GENITOURINARY TRACT, VIA NATURAL OR ARTIFICIAL OPENING: ICD-10-PCS | Performed by: OBSTETRICS & GYNECOLOGY

## 2017-06-26 PROCEDURE — 10D17ZZ EXTRACTION OF PRODUCTS OF CONCEPTION, RETAINED, VIA NATURAL OR ARTIFICIAL OPENING: ICD-10-PCS | Performed by: OBSTETRICS & GYNECOLOGY

## 2017-06-26 RX ORDER — HYDROMORPHONE HYDROCHLORIDE 1 MG/ML
0.4 INJECTION, SOLUTION INTRAMUSCULAR; INTRAVENOUS; SUBCUTANEOUS EVERY 5 MIN PRN
Status: DISPENSED | OUTPATIENT
Start: 2017-06-26 | End: 2017-06-26

## 2017-06-26 RX ORDER — NALOXONE HYDROCHLORIDE 0.4 MG/ML
80 INJECTION, SOLUTION INTRAMUSCULAR; INTRAVENOUS; SUBCUTANEOUS AS NEEDED
Status: ACTIVE | OUTPATIENT
Start: 2017-06-26 | End: 2017-06-26

## 2017-06-26 RX ORDER — ZOLPIDEM TARTRATE 5 MG/1
5 TABLET ORAL NIGHTLY PRN
Status: DISCONTINUED | OUTPATIENT
Start: 2017-06-26 | End: 2017-06-28

## 2017-06-26 RX ORDER — SODIUM CHLORIDE, SODIUM LACTATE, POTASSIUM CHLORIDE, CALCIUM CHLORIDE 600; 310; 30; 20 MG/100ML; MG/100ML; MG/100ML; MG/100ML
INJECTION, SOLUTION INTRAVENOUS CONTINUOUS
Status: DISCONTINUED | OUTPATIENT
Start: 2017-06-26 | End: 2017-06-27

## 2017-06-26 RX ORDER — MIDAZOLAM HYDROCHLORIDE 1 MG/ML
1 INJECTION INTRAMUSCULAR; INTRAVENOUS EVERY 5 MIN PRN
Status: ACTIVE | OUTPATIENT
Start: 2017-06-26 | End: 2017-06-26

## 2017-06-26 RX ORDER — DIPHENHYDRAMINE HYDROCHLORIDE 50 MG/ML
12.5 INJECTION INTRAMUSCULAR; INTRAVENOUS AS NEEDED
Status: ACTIVE | OUTPATIENT
Start: 2017-06-26 | End: 2017-06-26

## 2017-06-26 RX ORDER — DEXAMETHASONE SODIUM PHOSPHATE 4 MG/ML
4 VIAL (ML) INJECTION AS NEEDED
Status: ACTIVE | OUTPATIENT
Start: 2017-06-26 | End: 2017-06-26

## 2017-06-26 RX ORDER — HYDROMORPHONE HYDROCHLORIDE 1 MG/ML
1 INJECTION, SOLUTION INTRAMUSCULAR; INTRAVENOUS; SUBCUTANEOUS ONCE
Status: COMPLETED | OUTPATIENT
Start: 2017-06-26 | End: 2017-06-26

## 2017-06-26 RX ORDER — HYDROMORPHONE HYDROCHLORIDE 1 MG/ML
INJECTION, SOLUTION INTRAMUSCULAR; INTRAVENOUS; SUBCUTANEOUS
Status: COMPLETED
Start: 2017-06-26 | End: 2017-06-26

## 2017-06-26 RX ORDER — ONDANSETRON 2 MG/ML
4 INJECTION INTRAMUSCULAR; INTRAVENOUS AS NEEDED
Status: ACTIVE | OUTPATIENT
Start: 2017-06-26 | End: 2017-06-26

## 2017-06-26 RX ORDER — MEPERIDINE HYDROCHLORIDE 25 MG/ML
12.5 INJECTION INTRAMUSCULAR; INTRAVENOUS; SUBCUTANEOUS AS NEEDED
Status: DISCONTINUED | OUTPATIENT
Start: 2017-06-26 | End: 2017-06-27

## 2017-06-26 RX ORDER — SODIUM CHLORIDE 9 MG/ML
INJECTION, SOLUTION INTRAVENOUS CONTINUOUS
Status: DISCONTINUED | OUTPATIENT
Start: 2017-06-26 | End: 2017-06-27

## 2017-06-26 RX ORDER — MISOPROSTOL 200 UG/1
1000 TABLET ORAL ONCE
Status: COMPLETED | OUTPATIENT
Start: 2017-06-25 | End: 2017-06-25

## 2017-06-26 RX ORDER — CARBOPROST TROMETHAMINE 250 UG/ML
250 INJECTION, SOLUTION INTRAMUSCULAR ONCE
Status: COMPLETED | OUTPATIENT
Start: 2017-06-26 | End: 2017-06-26

## 2017-06-26 RX ORDER — CARBOPROST TROMETHAMINE 250 UG/ML
INJECTION, SOLUTION INTRAMUSCULAR
Status: COMPLETED
Start: 2017-06-26 | End: 2017-06-26

## 2017-06-26 NOTE — PROGRESS NOTES
OB PN  Called by RN for heavy vaginal bleeding, chux saturated, weighing in total 665cc. Bimanual, small amount of clot removed, some oozing. hemabate x 1 dose. Pitocin running, 2nd bag. Labs to be drawn. VSS. Will monitor closely.      Dajuan Castro

## 2017-06-26 NOTE — PROGRESS NOTES
Doing better. Mild frontal headache but does have hx of chronic issues. No scotomata or epigastric discomfort. Tolerating clears. /70   Pulse 92   Temp 100.3 °F (37.9 °C) (Oral)   Resp 20   Ht 66\"   Wt 193 lb   SpO2 95%   Breastfeeding?  No   BMI

## 2017-06-26 NOTE — ANESTHESIA PREPROCEDURE EVALUATION
PRE-OP EVALUATION    Patient Name: Merrick Green    Pre-op Diagnosis: * No pre-op diagnosis entered *    Procedure(s):      Surgeon(s) and Role:     Alline Bosworth, MD - Primary    Pre-op vitals reviewed.   Temp: 98 °F (36.7 °C)  Pulse: 82  Resp: 16  BP: 1 [] calcium gluconate injection 1 g 1 g Intravenous Once PRN   lactated ringers IV bolus 1,000 mL 1,000 mL Intravenous Once   fentaNYL 2mcg/ml & ropivacaine 0.15% epidural infusion  Epidural Continuous   [COMPLETED] fentaNYL citrate (SUBLIMAZE) 0.0 Complications           GI/Hepatic/Renal    Negative GI/hepatic/renal ROS. Cardiovascular    Negative cardiovascular ROS.     Exercise tolerance: good     MET: >4                                           Endo/Other    Negative e appropriate for age. Mallampati: II  Mouth opening: 3 FB  TM distance: 4 - 6 cm  Neck ROM: full Cardiovascular    Cardiovascular exam normal.  Rhythm: regular  Rate: normal     Dental    No notable dental history.          Pulmonary    Pulmonary exam manjula

## 2017-06-26 NOTE — CM/SW NOTE
SW attempted to meet with pt due to fetal demise. Chart reviewed and discussed with RN. Pt is currently resting.   No current needs identified at this time due to need for pt to rest.  SW confirmed that pt has emotional support and has been given inform

## 2017-06-26 NOTE — PROGRESS NOTES
OB PN  Repeat labs showed Hgb 8.1, PT/INR slightly elevated, fibrinogen wnl. 1 unit was started as PT/INR slightly elevated  VSS, no tachycardia.    Pt noted to have oozing still, not saturating pad, pt examined on bimanual clots noted in uterus, difficult

## 2017-06-26 NOTE — PROGRESS NOTES
Infusion of second unit of PRBCs complete, Dr. Aspen Mendez in department and aware of pt's temp. New orders received.

## 2017-06-26 NOTE — PROGRESS NOTES
Assessed pt's bleeding, fully saturated peripad and leaked onto chux in approximately 1 hour. Fundus firm at U/1. Dr. Jensen Setting in department and notified, MD assessed at bedside. Hemabate given.  Peripads weighed, total blood loss since delivery 665 cc, Dr. Ángela Watkins

## 2017-06-26 NOTE — OPERATIVE REPORT
Pre-Operative Diagnosis: Postpartum hemorrhage     Post-Operative Diagnosis: same     Procedure Performed:   Procedure(s):  Suction dilation and curettage  Bakri balloon placement    Surgeon(s) and Role:     Ahmet Mireles MD - Primary    Assistant(s) instruments were removed. The patient tolerated the procedure and was stable to the recovery room.      Jose D Carty MD  6/26/2017  6:26 AM

## 2017-06-26 NOTE — PROGRESS NOTES
OB PN  Pt is comfortable  AROM, bloody fluid  3/70/-2  Second dose of buccal cytotec given 400mcg  Clinically stable  Second unit of blood running  Will monitor closely    Erica Noble MD

## 2017-06-26 NOTE — PLAN OF CARE
Problem: Patient/Family Goals  Goal: Patient/Family Long Term Goal  Patient's Long Term Goal: uncomplicated delivery    Interventions:  -   - See additional Care Plan goals for specific interventions    Outcome: Progressing    Goal: Patient/Family Short Te fall injury  INTERVENTIONS:  - Assess pt frequently for physical needs  - Identify cognitive and physical deficits and behaviors that affect risk of falls.   - [place] fall precautions as indicated by assessment.  - Educate pt/family on patient safety inc

## 2017-06-26 NOTE — PROGRESS NOTES
06/26/17 1119   Clinical Encounter Type   Visited With Patient and family together   Restorationist Encounters   Restorationist Needs Prayer  (, patient and family prayed together/with blessing for baby.)

## 2017-06-26 NOTE — PROGRESS NOTES
06/26/17 1115   Clinical Encounter Type   Visited With Patient and family together   Patient's Supportive Strategies/Resources Patient finds community support at [facility]".     Patient Spiritual Encounters   Spiritual Interventions Chapl

## 2017-06-26 NOTE — L&D DELIVERY NOTE
Lizet Ruth, Pending   [[id number]]     Labor Events    Rupture date:  6/25/17  Rupture time:  0800   Rupture type:  AROM   Fluid color:  Bloody   Induction:  Misoprostol   Indications for induction:  Severe Preeclampsia, Fetal Demise          Labor Event Alfreda Pham 0    0                          Skin to Skin    No data filed          Vaginal Count    No data filed                 Vaginal Delivery Note          Callie Graciater Patient Status:  Inpatient    1975 MRN [id number]   Location [facility]

## 2017-06-26 NOTE — CONSULTS
[facility]  Maternal Fetal Medicine Progress Note    [facility] Patient Status:  Inpatient    1975 MRN [id number]   Location 1818 [facility] Attending Олег Lu MD   [address] Day # 1 PCP Felicia Guzman MD     SUBJECTIVE: factors. We discussed antiphospholipid syndrome as a risk for early, severe preeclampsia and that I recommend testing for this condition. It may not be possible at this time due to her having received multiple blood products.   I recommend evaluating for

## 2017-06-26 NOTE — ANESTHESIA POSTPROCEDURE EVALUATION
[facility] Patient Status:  Inpatient   Age/Gender 40year old female MRN [id number]   Location 1818 [facility] Attending Mundo Ramos MD   [address] Day # 1 PCP Felicia Guzman MD       Anesthesia Post-op Note    Proc

## 2017-06-26 NOTE — PROGRESS NOTES
[facility]    Progress Note    Callie Cantu Patient Status:  Inpatient    1975 MRN [id number]   Location 1818 [facility] Attending Naima Song MD   [facility] Day # 1 PCP Felicia Guzman MD     Subjective:   Callie Cantu is a pregnancy)      DIC, HELLP: Secondary to IUFD. Coags are normalizing. Fibrinogen is now normal.  Haptoglobin was undetectable yesterday and her LDH is elevated, c/w hemolysis with DIC. Continue to follow. Transfuse as needed to maintain hgb >7.   LFTs a

## 2017-06-26 NOTE — PROGRESS NOTES
Dr. Ashley Ruelas notified of pt's c/o back pain and most recent blood loss of 340 cc. Fundus firm, 1/U. MD at bedside to evaluate.

## 2017-06-27 LAB
ALBUMIN SERPL-MCNC: 1.9 G/DL (ref 3.5–4.8)
ALBUMIN SERPL-MCNC: 2.1 G/DL (ref 3.5–4.8)
ALBUMIN SERPL-MCNC: 2.2 G/DL (ref 3.5–4.8)
ALP LIVER SERPL-CCNC: 61 U/L (ref 37–98)
ALP LIVER SERPL-CCNC: 62 U/L (ref 37–98)
ALP LIVER SERPL-CCNC: 68 U/L (ref 37–98)
ALT SERPL-CCNC: 54 U/L (ref 14–54)
ALT SERPL-CCNC: 57 U/L (ref 14–54)
ALT SERPL-CCNC: 70 U/L (ref 14–54)
APTT PPP: 38.1 SECONDS (ref 25–34)
APTT PPP: 38.8 SECONDS (ref 25–34)
AST SERPL-CCNC: 45 U/L (ref 15–41)
AST SERPL-CCNC: 48 U/L (ref 15–41)
AST SERPL-CCNC: 57 U/L (ref 15–41)
AT3 ACTIVITY: 76 % (ref 80–120)
BASOPHILS # BLD AUTO: 0.01 X10(3) UL (ref 0–0.1)
BASOPHILS NFR BLD AUTO: 0.1 %
BETA 2 GLYCOPROTEIN 1 AB, IGG: <3.7 U/ML (ref ?–15)
BETA 2 GLYCOPROTEIN 1 AB, IGM: <3.7 U/ML (ref ?–15)
BILIRUB SERPL-MCNC: 0.5 MG/DL (ref 0.1–2)
BILIRUB SERPL-MCNC: 0.6 MG/DL (ref 0.1–2)
BILIRUB SERPL-MCNC: 0.6 MG/DL (ref 0.1–2)
BLOOD TYPE BARCODE: 6200
BUN BLD-MCNC: 3 MG/DL (ref 8–20)
BUN BLD-MCNC: 3 MG/DL (ref 8–20)
BUN BLD-MCNC: 4 MG/DL (ref 8–20)
CALCIUM BLD-MCNC: 6.8 MG/DL (ref 8.3–10.3)
CALCIUM BLD-MCNC: 7 MG/DL (ref 8.3–10.3)
CALCIUM BLD-MCNC: 7.4 MG/DL (ref 8.3–10.3)
CHLORIDE: 109 MMOL/L (ref 101–111)
CO2: 28 MMOL/L (ref 22–32)
CO2: 28 MMOL/L (ref 22–32)
CO2: 31 MMOL/L (ref 22–32)
CREAT BLD-MCNC: 0.57 MG/DL (ref 0.55–1.02)
CREAT BLD-MCNC: 0.6 MG/DL (ref 0.55–1.02)
CREAT BLD-MCNC: 0.7 MG/DL (ref 0.55–1.02)
DRVVT LUPUS ANTICOAGULANT: NEGATIVE
DRVVT SCREEN RATIO: 1.01 (ref 0–1.29)
EOSINOPHIL # BLD AUTO: 0.1 X10(3) UL (ref 0–0.3)
EOSINOPHIL # BLD AUTO: 0.1 X10(3) UL (ref 0–0.3)
EOSINOPHIL # BLD AUTO: 0.13 X10(3) UL (ref 0–0.3)
EOSINOPHIL NFR BLD AUTO: 1 %
EOSINOPHIL NFR BLD AUTO: 1 %
EOSINOPHIL NFR BLD AUTO: 1.3 %
ERYTHROCYTE [DISTWIDTH] IN BLOOD BY AUTOMATED COUNT: 14.5 % (ref 11.5–16)
ERYTHROCYTE [DISTWIDTH] IN BLOOD BY AUTOMATED COUNT: 14.6 % (ref 11.5–16)
ERYTHROCYTE [DISTWIDTH] IN BLOOD BY AUTOMATED COUNT: 14.7 % (ref 11.5–16)
FIBRINOGEN: 351 MG/DL (ref 200–446)
FIBRINOGEN: 356 MG/DL (ref 200–446)
FIBRINOGEN: 412 MG/DL (ref 200–446)
GLUCOSE BLD-MCNC: 73 MG/DL (ref 70–99)
GLUCOSE BLD-MCNC: 84 MG/DL (ref 70–99)
GLUCOSE BLD-MCNC: 91 MG/DL (ref 70–99)
HAPTOGLOBIN: 116 MG/DL (ref 30–200)
HCT VFR BLD AUTO: 21.1 % (ref 34–50)
HCT VFR BLD AUTO: 21.9 % (ref 34–50)
HCT VFR BLD AUTO: 23.7 % (ref 34–50)
HGB BLD-MCNC: 7.4 G/DL (ref 12–16)
HGB BLD-MCNC: 7.7 G/DL (ref 12–16)
HGB BLD-MCNC: 8.2 G/DL (ref 12–16)
IMMATURE GRANULOCYTE COUNT: 0.05 X10(3) UL (ref 0–1)
IMMATURE GRANULOCYTE COUNT: 0.05 X10(3) UL (ref 0–1)
IMMATURE GRANULOCYTE COUNT: 0.08 X10(3) UL (ref 0–1)
IMMATURE GRANULOCYTE RATIO %: 0.5 %
IMMATURE GRANULOCYTE RATIO %: 0.5 %
IMMATURE GRANULOCYTE RATIO %: 0.8 %
INR BLD: 1.08 (ref 0.89–1.11)
INR BLD: 1.17 (ref 0.89–1.11)
INR BLD: 1.2 (ref 0.89–1.11)
LDH: 333 U/L (ref 84–246)
LYMPHOCYTES # BLD AUTO: 1.1 X10(3) UL (ref 0.9–4)
LYMPHOCYTES # BLD AUTO: 1.22 X10(3) UL (ref 0.9–4)
LYMPHOCYTES # BLD AUTO: 1.26 X10(3) UL (ref 0.9–4)
LYMPHOCYTES NFR BLD AUTO: 10.9 %
LYMPHOCYTES NFR BLD AUTO: 12.4 %
LYMPHOCYTES NFR BLD AUTO: 12.5 %
M PROTEIN MFR SERPL ELPH: 4.6 G/DL (ref 6.1–8.3)
M PROTEIN MFR SERPL ELPH: 4.9 G/DL (ref 6.1–8.3)
M PROTEIN MFR SERPL ELPH: 5.3 G/DL (ref 6.1–8.3)
MCH RBC QN AUTO: 30 PG (ref 27–33.2)
MCH RBC QN AUTO: 30.1 PG (ref 27–33.2)
MCH RBC QN AUTO: 30.2 PG (ref 27–33.2)
MCHC RBC AUTO-ENTMCNC: 34.6 G/DL (ref 31–37)
MCHC RBC AUTO-ENTMCNC: 35.1 G/DL (ref 31–37)
MCHC RBC AUTO-ENTMCNC: 35.2 G/DL (ref 31–37)
MCV RBC AUTO: 85.4 FL (ref 81–100)
MCV RBC AUTO: 85.9 FL (ref 81–100)
MCV RBC AUTO: 87.1 FL (ref 81–100)
MONOCYTES # BLD AUTO: 0.64 X10(3) UL (ref 0.1–0.6)
MONOCYTES # BLD AUTO: 0.9 X10(3) UL (ref 0.1–0.6)
MONOCYTES # BLD AUTO: 0.91 X10(3) UL (ref 0.1–0.6)
MONOCYTES NFR BLD AUTO: 6.3 %
MONOCYTES NFR BLD AUTO: 9 %
MONOCYTES NFR BLD AUTO: 9.3 %
NEUTROPHIL ABS PRELIM: 7.48 X10 (3) UL (ref 1.3–6.7)
NEUTROPHIL ABS PRELIM: 7.73 X10 (3) UL (ref 1.3–6.7)
NEUTROPHIL ABS PRELIM: 8.2 X10 (3) UL (ref 1.3–6.7)
NEUTROPHILS # BLD AUTO: 7.48 X10(3) UL (ref 1.3–6.7)
NEUTROPHILS # BLD AUTO: 7.73 X10(3) UL (ref 1.3–6.7)
NEUTROPHILS # BLD AUTO: 8.2 X10(3) UL (ref 1.3–6.7)
NEUTROPHILS NFR BLD AUTO: 76.4 %
NEUTROPHILS NFR BLD AUTO: 76.9 %
NEUTROPHILS NFR BLD AUTO: 80.9 %
PHOSPHOLIPID AB, IGG: NEGATIVE
PHOSPHOLIPID AB, IGG: NEGATIVE
PHOSPHOLIPID AB, IGM: NEGATIVE
PHOSPHOLIPID AB, IGM: NEGATIVE
PLATELET # BLD AUTO: 105 10(3)UL (ref 150–450)
PLATELET # BLD AUTO: 84 10(3)UL (ref 150–450)
PLATELET # BLD AUTO: 90 10(3)UL (ref 150–450)
POTASSIUM SERPL-SCNC: 3.3 MMOL/L (ref 3.6–5.1)
POTASSIUM SERPL-SCNC: 3.4 MMOL/L (ref 3.6–5.1)
POTASSIUM SERPL-SCNC: 3.5 MMOL/L (ref 3.6–5.1)
PROTEIN C ACTIVITY: 100 % (ref 70–130)
PROTEIN S ACTIVITY: 69 % (ref 65–140)
PSA SERPL DL<=0.01 NG/ML-MCNC: 14 SECONDS (ref 12–14.3)
PSA SERPL DL<=0.01 NG/ML-MCNC: 15 SECONDS (ref 12–14.3)
PSA SERPL DL<=0.01 NG/ML-MCNC: 15.3 SECONDS (ref 12–14.3)
RBC # BLD AUTO: 2.47 X10(6)UL (ref 3.8–5.1)
RBC # BLD AUTO: 2.55 X10(6)UL (ref 3.8–5.1)
RBC # BLD AUTO: 2.72 X10(6)UL (ref 3.8–5.1)
RED CELL DISTRIBUTION WIDTH-SD: 44.7 FL (ref 35.1–46.3)
RED CELL DISTRIBUTION WIDTH-SD: 45 FL (ref 35.1–46.3)
RED CELL DISTRIBUTION WIDTH-SD: 46.1 FL (ref 35.1–46.3)
RETIC ABS CALC AUTO: 75.3 X10(3) UL (ref 22.5–147.5)
RETIC IRF CALC: 0.34 RATIO (ref 0.09–0.3)
RETIC%: 3.1 % (ref 0.5–2.5)
RETICULOCYTE HEMOGLOBIN EQUIVALENT: 37.2 PG (ref 28.2–36.3)
SODIUM SERPL-SCNC: 143 MMOL/L (ref 136–144)
SODIUM SERPL-SCNC: 143 MMOL/L (ref 136–144)
SODIUM SERPL-SCNC: 144 MMOL/L (ref 136–144)
STACLOT LA DELTA: 2.2 SECONDS (ref ?–8)
STACLOT LA: NEGATIVE
WBC # BLD AUTO: 10.1 X10(3) UL (ref 4–13)
WBC # BLD AUTO: 10.1 X10(3) UL (ref 4–13)
WBC # BLD AUTO: 9.8 X10(3) UL (ref 4–13)

## 2017-06-27 PROCEDURE — 85025 COMPLETE CBC W/AUTO DIFF WBC: CPT | Performed by: OBSTETRICS & GYNECOLOGY

## 2017-06-27 PROCEDURE — 99232 SBSQ HOSP IP/OBS MODERATE 35: CPT | Performed by: INTERNAL MEDICINE

## 2017-06-27 PROCEDURE — 85384 FIBRINOGEN ACTIVITY: CPT | Performed by: OBSTETRICS & GYNECOLOGY

## 2017-06-27 PROCEDURE — 85610 PROTHROMBIN TIME: CPT | Performed by: OBSTETRICS & GYNECOLOGY

## 2017-06-27 PROCEDURE — 83010 ASSAY OF HAPTOGLOBIN QUANT: CPT | Performed by: INTERNAL MEDICINE

## 2017-06-27 PROCEDURE — 85730 THROMBOPLASTIN TIME PARTIAL: CPT | Performed by: OBSTETRICS & GYNECOLOGY

## 2017-06-27 RX ORDER — NALBUPHINE HCL 10 MG/ML
2.5 AMPUL (ML) INJECTION
Status: DISCONTINUED | OUTPATIENT
Start: 2017-06-27 | End: 2017-06-27

## 2017-06-27 RX ORDER — EPHEDRINE SULFATE 50 MG/ML
5 INJECTION, SOLUTION INTRAVENOUS AS NEEDED
Status: DISCONTINUED | OUTPATIENT
Start: 2017-06-27 | End: 2017-06-27

## 2017-06-27 NOTE — PROGRESS NOTES
[facility]    Progress Note    Cosme Lucio Patient Status:  Inpatient    1975 MRN [id number]   Location 1818 [facility] Attending Josesito Anthony MD   [facility] Day # 2 PCP Felicia Guzman MD     Subjective:   Cosme Lucio is a pregnancy)     Postpartum hemorrhage, delivered      DIC, HELLP: Secondary to IUFD. Coags are normal.  Fibrinogen is now normal.  Haptoglobin now normal, indicating no further hemolysis. D/C q6 hour labs.   Check CBC in AM.  If hgb and platelets are stabl

## 2017-06-27 NOTE — PROGRESS NOTES
PPD # 2 from vaginal delivery. POD # 1 from [facility]  and placement of Bakri balloon. Doing better. Got some sleep. Some cramping and uterine pressure, probably related to Bakri. Urine clear and copious.  Bleeding minimal estimated 200 cc from Bakri, mostly th

## 2017-06-27 NOTE — PROGRESS NOTES
RN on phone with Dr. Lieutenant Moncada. MD aware of pt sister's desire to speak to him. He is aware that sister is an OB/GYN. She is inquiring about POC regarding the Magnesium Sulfate infusion.  POC reviewed between MD and RN and Dr. Lieutenant Moncada plans to continue POC as r

## 2017-06-27 NOTE — PAYOR COMM NOTE
--------------  ADMISSION REVIEW     Payor: Alina Marie [facility] #:  [id number]  Authorization Number: [id number]    Admit date: 6/25/2017  1:48 AM       Admitting Physician: Nhan Persaud MD  Attending Physician:  Nhan Persaud MD  Primary fibroids  PNL: A pos, harmony wnl, AFP wnl    Pt uncomfortable w/ cramping and low back pain. SVE 1/30/-3. Pt would like something for pain control. Pt sister is here she is an OB GYN from [place]. Estimated Date of Delivery: 10/1/17  No LMP recorded. to monitor closely. If persistently elevated will start magnesium sulfate for seizure prophylaxis  -PIH labs pending[DC. 2]      Risks, benefits, alternatives and possible complications have been discussed in detail with the patient.   Pre-admission, admissi [address] (none) Intravenous Ute Shabazz, RN

## 2017-06-27 NOTE — PLAN OF CARE
Anxiety    • Anxiety is at manageable level Progressing        NEUROLOGICAL - ADULT    • Achieves stable or improved neurological status Progressing    • Absence of seizures Progressing    • Remains free of injury related to seizure activity Progressing

## 2017-06-27 NOTE — PROGRESS NOTES
Charge RN and this RN at bedside discussing POC and concerns with pt and pt's sister. Sister has expressed her concerns with the Magnesium Sulfate infusion and the plan to continue through the night.  Both RN's receptive to concerns and both the pt and sist

## 2017-06-27 NOTE — PROGRESS NOTES
Dr. Bette Doe updated on pt status including lab values, bleeding, VS, and I&O. No new orders received at this time.

## 2017-06-27 NOTE — PROGRESS NOTES
Dr. Vicenta Ron on phone and notified of sister's disappointment in not being able to communicate with him and pt's refusal to continue Magnesium Sulfate beyond the 24hours from delivery. Dr. Vicenta Ron requesting to speak to pt's sister at this time.  Pt's sister i

## 2017-06-27 NOTE — CM/SW NOTE
SW met with pt to provide support and encouragement. Pt tearfully discussed feelings and thoughts on loss of baby boy, Hernando. She states that she has support from her fiance and her sister but is feeling \"lost\".  She states she has felt a wave of emotion

## 2017-06-28 VITALS
TEMPERATURE: 99 F | HEART RATE: 102 BPM | BODY MASS INDEX: 31.02 KG/M2 | WEIGHT: 193 LBS | DIASTOLIC BLOOD PRESSURE: 81 MMHG | HEIGHT: 66 IN | SYSTOLIC BLOOD PRESSURE: 143 MMHG | RESPIRATION RATE: 16 BRPM | OXYGEN SATURATION: 88 %

## 2017-06-28 PROBLEM — D25.9 UTERINE FIBROID DURING PREGNANCY, ANTEPARTUM: Status: ACTIVE | Noted: 2017-06-19

## 2017-06-28 PROBLEM — Z86.79 HX OF ESSENTIAL HYPERTENSION: Status: ACTIVE | Noted: 2017-06-19

## 2017-06-28 PROBLEM — F41.9 ANXIETY: Status: ACTIVE | Noted: 2017-06-19

## 2017-06-28 PROBLEM — O34.10 UTERINE FIBROID DURING PREGNANCY, ANTEPARTUM: Status: ACTIVE | Noted: 2017-06-19

## 2017-06-28 PROBLEM — O09.519 ELDERLY PRIMIGRAVIDA, ANTEPARTUM: Status: ACTIVE | Noted: 2017-06-19

## 2017-06-28 LAB
BASOPHILS # BLD AUTO: 0.02 X10(3) UL (ref 0–0.1)
BASOPHILS NFR BLD AUTO: 0.2 %
EOSINOPHIL # BLD AUTO: 0.1 X10(3) UL (ref 0–0.3)
EOSINOPHIL NFR BLD AUTO: 1 %
ERYTHROCYTE [DISTWIDTH] IN BLOOD BY AUTOMATED COUNT: 14.5 % (ref 11.5–16)
HCT VFR BLD AUTO: 23.1 % (ref 34–50)
HGB BLD-MCNC: 7.9 G/DL (ref 12–16)
IMMATURE GRANULOCYTE COUNT: 0.11 X10(3) UL (ref 0–1)
IMMATURE GRANULOCYTE RATIO %: 1.1 %
LYMPHOCYTES # BLD AUTO: 1.51 X10(3) UL (ref 0.9–4)
LYMPHOCYTES NFR BLD AUTO: 14.8 %
MCH RBC QN AUTO: 29.7 PG (ref 27–33.2)
MCHC RBC AUTO-ENTMCNC: 34.2 G/DL (ref 31–37)
MCV RBC AUTO: 86.8 FL (ref 81–100)
MONOCYTES # BLD AUTO: 0.67 X10(3) UL (ref 0.1–0.6)
MONOCYTES NFR BLD AUTO: 6.6 %
NEUTROPHIL ABS PRELIM: 7.77 X10 (3) UL (ref 1.3–6.7)
NEUTROPHILS # BLD AUTO: 7.77 X10(3) UL (ref 1.3–6.7)
NEUTROPHILS NFR BLD AUTO: 76.3 %
PLATELET # BLD AUTO: 137 10(3)UL (ref 150–450)
RBC # BLD AUTO: 2.66 X10(6)UL (ref 3.8–5.1)
RED CELL DISTRIBUTION WIDTH-SD: 44.8 FL (ref 35.1–46.3)
WBC # BLD AUTO: 10.2 X10(3) UL (ref 4–13)

## 2017-06-28 RX ORDER — ALPRAZOLAM 0.25 MG/1
0.25 TABLET ORAL 2 TIMES DAILY PRN
Status: DISCONTINUED | OUTPATIENT
Start: 2017-06-28 | End: 2017-06-28

## 2017-06-28 RX ORDER — HYDROCHLOROTHIAZIDE 25 MG/1
25 TABLET ORAL DAILY
Qty: 30 TABLET | Refills: 0 | Status: SHIPPED | OUTPATIENT
Start: 2017-06-28 | End: 2017-07-29

## 2017-06-28 RX ORDER — HYDROCHLOROTHIAZIDE 25 MG/1
25 TABLET ORAL DAILY
Status: DISCONTINUED | OUTPATIENT
Start: 2017-06-28 | End: 2017-06-28

## 2017-06-28 RX ORDER — ALPRAZOLAM 0.25 MG/1
0.25 TABLET ORAL 2 TIMES DAILY PRN
Qty: 20 TABLET | Refills: 0 | Status: SHIPPED | OUTPATIENT
Start: 2017-06-28 | End: 2017-07-31

## 2017-06-28 NOTE — PROGRESS NOTES
RN at bedside. Pt reports feeling anxious about leaving today. She is anxious about leaving without her baby and worried about her own personal health following everything she has experienced since arriving here.  Patient is worried that her anxiety is affe

## 2017-06-28 NOTE — PROGRESS NOTES
Doing better. Sore throat from intubation. Tolerating general diet. Voiding fine. Bleeding minimal.    /68   Pulse 109   Temp 98.4 °F (36.9 °C) (Oral)   Resp 20   Ht 66\"   Wt 193 lb   SpO2 (!) 88%   Breastfeeding?  No   BMI 31.15 kg/m²     Hematology

## 2017-06-28 NOTE — PROGRESS NOTES
06/27/17 2120   Clinical Encounter Type   Visited With Family   Routine Visit Follow-up   Continue Visiting No   Crisis Visit (Parents in grief, asked for a final blessing for their baby)   Patient's Supportive Strategies/Resources  provided Tyshawn Michel

## 2017-06-28 NOTE — CM/SW NOTE
SW attempted to meet with pt to provide support as discharge is anticipated for today. Pt is currently sleeping. SW spoke to Lissa Orosco. Pt requested no further follow up for social work or  at this time.   SW offered to provide support if needed whe

## 2017-06-28 NOTE — DISCHARGE SUMMARY
[facility]  Discharge Summary    Olvin Lombardi Patient Status:  Inpatient    1975 MRN [id number]   Location 1818 [facility] Attending Jairo Bowie MD   [address] Day # 3 PCP Felicia Humphrey MD     Date of Admission: 2017 Tab  Take 1 tablet by mouth daily. ferrous sulfate 325 (65 FE) MG Oral Tab EC  Take 325 mg by mouth daily with breakfast.    Cholecalciferol (VITAMIN D3) 3000 units Oral Tab  Take by mouth. aspirin 81 MG Oral Tab  Take 81 mg by mouth daily.     Cyanoc

## 2017-06-28 NOTE — PROGRESS NOTES
gris Barrlain, at bedside to perform blessing for family per patient request. Patient tearful, though grateful.

## 2017-06-28 NOTE — PROGRESS NOTES
Discharge instructions reinforced verbally and written instructions given with understanding verbalized. Share info with momentos given.  D/C to home per w/c in stable cond accompanied by SO.

## 2017-06-28 NOTE — PROGRESS NOTES
Rcd pt resting in bed. Dr Justice Lopez @ BS, D/C instructions explained with understanding verbalized. Pt states she feels sad but denies need for further assistance at this time.

## 2017-06-29 NOTE — PAYOR COMM NOTE
--------------  CONTINUED STAY REVIEW    Payor: Alina Marie [facility] #:  [id number]  Authorization Number: [id number]    Admit date: 6/25/2017  1:48 AM       Admitting Physician: Ly Rogel MD  Attending Physician:  No att. providers found

## 2017-07-03 ENCOUNTER — TELEPHONE (OUTPATIENT)
Dept: OBGYN CLINIC | Facility: CLINIC | Age: 42
End: 2017-07-03

## 2017-07-03 NOTE — TELEPHONE ENCOUNTER
Received via mail authorization of services for pt  Ref # [id number]  Contract: EPO  Dates: 06/25/2017 - 06/28/2017  4 days

## 2017-07-24 RX ORDER — HYDROCHLOROTHIAZIDE 25 MG/1
TABLET ORAL
Qty: 30 TABLET | Refills: 0 | OUTPATIENT
Start: 2017-07-24

## 2017-07-26 ENCOUNTER — TELEPHONE (OUTPATIENT)
Dept: OBGYN CLINIC | Facility: CLINIC | Age: 42
End: 2017-07-26

## 2017-07-26 NOTE — TELEPHONE ENCOUNTER
Patient had IUFD on 06/26/17. Received refill request from Lisa Santana for Hydrochlorothiazide 25 MG tablets. Patient is not a current patient, our physicians delivered in hospital.  Please advise if patient needs appt.

## 2017-07-26 NOTE — TELEPHONE ENCOUNTER
Patient would need a follow up appointment or need to be seen in the office for any further refills. Please call patient and make appt.

## 2017-07-31 PROBLEM — Z87.59 HISTORY OF MISCARRIAGE: Status: ACTIVE | Noted: 2017-07-31

## 2017-08-28 PROBLEM — Z34.90 PREGNANCY: Status: RESOLVED | Noted: 2017-06-25 | Resolved: 2017-08-28

## 2017-09-15 PROBLEM — Z86.79 HX OF ESSENTIAL HYPERTENSION: Status: RESOLVED | Noted: 2017-06-19 | Resolved: 2017-09-15

## 2017-12-26 PROBLEM — M94.262 CHONDROMALACIA, LEFT KNEE: Status: ACTIVE | Noted: 2017-12-26

## 2018-06-12 ENCOUNTER — TELEPHONE (OUTPATIENT)
Dept: OBGYN UNIT | Facility: HOSPITAL | Age: 43
End: 2018-06-12

## 2019-07-24 PROBLEM — M65.4 RADIAL STYLOID TENOSYNOVITIS (DE QUERVAIN): Status: ACTIVE | Noted: 2019-07-24

## 2020-05-18 PROBLEM — M71.22 BAKER'S CYST, LEFT: Status: ACTIVE | Noted: 2020-05-18

## 2020-05-18 PROBLEM — S83.512D RUPTURE OF ANTERIOR CRUCIATE LIGAMENT OF LEFT KNEE, SUBSEQUENT ENCOUNTER: Status: ACTIVE | Noted: 2020-05-18

## 2020-05-18 PROBLEM — M17.12 PRIMARY OSTEOARTHRITIS OF LEFT KNEE: Status: ACTIVE | Noted: 2020-05-18

## 2023-03-21 NOTE — PLAN OF CARE
Problem: Patient/Family Goals  Goal: Patient/Family Long Term Goal  Patient's Long Term Goal: uncomplicated delivery    Interventions:  -   - See additional Care Plan goals for specific interventions    Outcome: Completed Date Met: 06/26/17    Goal: [place] Not on file   Intimate Partner Violence: Not on file   Housing Stability: Unknown    Unable to Pay for Housing in the Last Year: Not on file    Number of [place] in the Last Year: Not on file    Unstable Housing in the Last Year: No        /82   Pulse 96   Ht 5' 6\" (1.676 m)   Wt 251 lb (113.9 kg)   SpO2 99%   BMI 40.51 kg/m²        Physical Exam:    General appearance - alert, well appearing, and in no distress, morbidly obese  Mental Status - alert, oriented to person, place, and time  Eyes - pupils equal and reactive, extraocular eye movements intact   Ears - bilateral TM's and external ear canals normal   Nose - normal and patent, no erythema, discharge or polyps   Sinuses - Normal paranasal sinuses without tenderness   Throat - mucous membranes moist, pharynx normal without lesions   Neck - supple, no significant adenopathy   Thyroid - thyroid is normal in size without nodules or tenderness    Chest - clear to auscultation, no wheezes, rales or rhonchi, symmetric air entry   Heart - normal rate, regular rhythm, normal S1, S2, no murmurs, rubs, clicks or gallops  Abdomen - soft, nontender, nondistended, no masses or organomegaly   Back exam -limited range of motion. Neurological - alert, oriented, normal speech, no focal findings or movement disorder noted   Musculoskeletal - no joint tenderness, deformity or swelling   Extremities - peripheral pulses normal, no pedal edema, no clubbing or cyanosis   Skin - normal coloration and turgor, no rashes, no suspicious skin lesions noted      Reviewed images from [place] which included an MRI of the thoracic region and cervical and lumbar spine.   (Can be found in care everywhere)    Labs   No results found for: TSHREFLEX  TSH   Date Value Ref Range Status   02/26/2023 3.00 0.44 - 3.9 mIU/L Final       Lab Results   Component Value Date     (L) 03/01/2023    K 4.1 03/01/2023     03/01/2023    CO2 18 (L) 02/16/2023    BUN 13 02/16/2023

## 2023-04-17 NOTE — LETTER
[facility]  [address] [facility], [address] [place] [address]    Consent for Operation    Date: __________________    Time: _______________    1.  I authorize the performance upon Awilda Isidro the following operation:                              Dilation an videotape. The [facility] will not be responsible for storage or maintenance of this tape. 6. For the purpose of advancing medical education, I consent to the admittance of observers to the Operating Room.     7. I authorize the use of any specimen, organs Signature of Patient:   ___________________________    When the patient is a minor or mentally incompetent to give consent:  Signature of person authorized to consent for patient: ___________________________   Relationship to patient: _____________________ · If I am allergic to anything or have had a reaction to anesthesia before. 3. I understand how the anesthesia medicine will help me (benefits). 4. I understand that with any type of anesthesia medicine there are risks:  a.  The most common risks are: their representative has agreed to have anesthesia services.     _____________________________________________________________________________  Witness        Date   Time  I have verified that the signature is that of the patient or patient’s representative Name band;

## (undated) NOTE — LETTER
[address] [facility] FOR BLOOD OR BLOOD COMPONENTS      In the course of your treatment, it may become necessary to administer a transfusion of blood or blood components.  This form provides basic information concerning this proc explain the alternatives to you if it has not already been done. I,Luz Parra, have read/had read to me the above. I understand the matters bearing on the decision whether or not to authorize a transfusion of blood or blood components.  I have no questi

## (undated) NOTE — LETTER
[facility]  [id number] [facility], [address] [place] [address]    Consent for Operation    Date: __________________    Time: _______________    1.  I authorize the performance upon Lani Allen the following operation:                              Dilation an videotape. [facility] will not be responsible for storage or maintenance of this tape. 6. For the purpose of advancing medical education, I consent to the admittance of observers to the Operating Room.     7. I authorize the use of any specimen, organs Signature of Patient:   ___________________________    When the patient is a minor or mentally incompetent to give consent:  Signature of person authorized to consent for patient: ___________________________   Relationship to patient: _____________________ · If I am allergic to anything or have had a reaction to anesthesia before. 3. I understand how the anesthesia medicine will help me (benefits). 4. I understand that with any type of anesthesia medicine there are risks:  a.  The most common risks are: their representative has agreed to have anesthesia services.     _____________________________________________________________________________  Witness        Date   Time  I have verified that the signature is that of the patient or patient’s representative

## (undated) NOTE — LETTER
[facility]  Mavis Freitas 62 [id number] [facility], [address] [place] [address]    Consent for Operation    Date: __________________    Time: _______________    1.  I authorize the performance upon Farrah March the following operation:                              Dilation an videotape. [facility] will not be responsible for storage or maintenance of this tape. 6. For the purpose of advancing medical education, I consent to the admittance of observers to the Operating Room.     7. I authorize the use of any specimen, organs Signature of Patient:   ___________________________    When the patient is a minor or mentally incompetent to give consent:  Signature of person authorized to consent for patient: ___________________________   Relationship to patient: _____________________ · If I am allergic to anything or have had a reaction to anesthesia before. 3. I understand how the anesthesia medicine will help me (benefits). 4. I understand that with any type of anesthesia medicine there are risks:  a.  The most common risks are: their representative has agreed to have anesthesia services.     _____________________________________________________________________________  Witness        Date   Time  I have verified that the signature is that of the patient or patient’s representative